# Patient Record
Sex: MALE | Race: WHITE
[De-identification: names, ages, dates, MRNs, and addresses within clinical notes are randomized per-mention and may not be internally consistent; named-entity substitution may affect disease eponyms.]

---

## 2018-05-08 ENCOUNTER — HOSPITAL ENCOUNTER (EMERGENCY)
Dept: HOSPITAL 25 - UCEAST | Age: 63
Discharge: HOME | End: 2018-05-08
Payer: COMMERCIAL

## 2018-05-08 VITALS — SYSTOLIC BLOOD PRESSURE: 146 MMHG | DIASTOLIC BLOOD PRESSURE: 81 MMHG

## 2018-05-08 DIAGNOSIS — M17.11: ICD-10-CM

## 2018-05-08 DIAGNOSIS — Z88.8: ICD-10-CM

## 2018-05-08 DIAGNOSIS — Z91.041: ICD-10-CM

## 2018-05-08 DIAGNOSIS — M25.561: Primary | ICD-10-CM

## 2018-05-08 PROCEDURE — 99202 OFFICE O/P NEW SF 15 MIN: CPT

## 2018-05-08 PROCEDURE — G0463 HOSPITAL OUTPT CLINIC VISIT: HCPCS

## 2018-05-08 NOTE — UC
Knee Pain HPI





- HPI Summary


HPI Summary: 





Patient is a 63-year-old male presenting to the  with chief complaint of 

right knee pain after stepping off of his tractor this morning, feeling a "pop" 

sound and states he has been having lateral knee pain since that time.  He has 

remained ambulatory, but states he is limping due to pain.  He has not taken 

anything over-the-counter for relief.  He has not used ice or heat.  He has 

been wearing a brace that he has had home.  Left hip surgery 8 years ago so he 

states he favors the right side.  Denies any numbness or tingling.  There is a 

mild amount of swelling to the knee.





- History of Current Complaint


Hx Obtained From: Patient


Onset/Duration: Sudden Onset


Severity Initially: Mild


Severity Currently: Mild


Pain Intensity: 5


Pain Scale Used: 0-10 Numeric


Character: Aching


Aggravating Factor(s): Weight Bearing


Alleviating Factor(s): Rest


Associated Signs And Symptoms: Positive: Swelling


Able to Bear Weight: Yes





- Risk Factors


Septic Arthritis Risk Factor: Negative


Gout Risk Factor: Age ^ 39, Male





<Aissatou Mueller - Last Filed: 05/08/18 19:08>





<Dina Plunkett - Last Filed: 05/08/18 19:29>





- History of Current Complaint


Chief Complaint: UCLowerExtremity


Stated Complaint: RIGHT KNEE INJURY


Time Seen by Provider: 05/08/18 18:04





- Allergies/Home Medications


Allergies/Adverse Reactions: 


 Allergies











Allergy/AdvReac Type Severity Reaction Status Date / Time


 


diphenhydramine Allergy  Hallucinati Verified 05/08/18 18:02





[From Benadryl]   ons  


 


Iodinated Contrast- Oral and Allergy  Unknown Verified 05/08/18 18:02





IV Dye   Reaction  





   Details  











Home Medications: 


 Home Medications





Atorvastatin* [Lipitor 10 MG*] 10 mg PO DAILY 05/08/18 [History Confirmed 05/08/ 18]











PMH/Surg Hx/FS Hx/Imm Hx


Previously Healthy: Yes





- Surgical History


Surgical History: Yes


Surgery Procedure, Year, and Place: APPENDECTOMY, 4 LITHOTRIPSYS, LT HIP 

REPLACEMENT





- Family History


Known Family History: Positive: Cardiac Disease, Other - cancer





- Social History


Occupation: Employed Full-time


Lives: With Family


Alcohol Use: None


Substance Use Type: None


Smoking Status (MU): Never Smoked Tobacco





<Aissatou Mueller - Last Filed: 05/08/18 19:08>





Review of Systems


Constitutional: Negative


Skin: Negative


ENT: Negative


Respiratory: Negative


Cardiovascular: Negative


Motor: Negative


Neurovascular: Negative


Musculoskeletal: Arthralgia


Neurological: Negative


Is Patient Immunocompromised?: No


All Other Systems Reviewed And Are Negative: Yes





<Aissatou Mueller UNIQUE - Last Filed: 05/08/18 19:08>





Physical Exam


Triage Information Reviewed: Yes


Appearance: Well-Appearing, Well-Nourished


Vital Signs: 


 Initial Vital Signs











Temp  97.5 F   05/08/18 17:55


 


Pulse  69   05/08/18 17:55


 


Resp  20   05/08/18 17:55


 


BP  146/81   05/08/18 17:55


 


Pulse Ox  97   05/08/18 17:55











Vital Signs Reviewed: Yes


Eye Exam: Normal


Eyes: Positive: Conjunctiva Clear


Neck exam: Normal


Neck: Positive: Supple, Nontender, No Lymphadenopathy


Respiratory Exam: Normal


Respiratory: Positive: Chest non-tender, Lungs clear


Cardiovascular Exam: Normal


Cardiovascular: Positive: RRR


Musculoskeletal: Positive: ROM Limited @ - R knee - d/t pain


Psychological Exam: Normal


Psychological: Positive: Normal Response To Family





<Aissatou Mueller UNIQUE - Last Filed: 05/08/18 19:08>


Vital Signs: 


 Initial Vital Signs











Temp  97.5 F   05/08/18 17:55


 


Pulse  69   05/08/18 17:55


 


Resp  20   05/08/18 17:55


 


BP  146/81   05/08/18 17:55


 


Pulse Ox  97   05/08/18 17:55














<Dina Plunkett - Last Filed: 05/08/18 19:29>





Knee Pain Course/Dx





- Course


Course Of Treatment: During the course of treatment, the patient is evaluated 

for right knee pain and swelling after stepping off his tractor this morning.  

He states he has been ambulating, but with pain.  Thorough physical exam was 

performed, focusing on knee special tests.  Pain on palpation over lateral 

aspect and superior aspect of knee with mild amount of effusion.  Due to 

patient pain around injury, physical exam was limited.  Valgus and varus force 

without pain.  No posterior sag sign, -posterior drawer test, - anterior drawer 

test.  Quadriceps active test negative.   Mcmurrys test not performed.  No 

laxity in the joint noted.   No temperature change or pallor noted bilaterally.

  No ecchymosis noted over knee. No lesion or disruption of skin is seen. Able 

to bear weight but with pain.  Knee immobilizer given.  Crutches given. Pulses 

intact bilaterally.  patient will follow up with ortho.





- Differential Dx/Diagnosis


Provider Diagnoses: Knee pain





<Aissatou Mueller - Last Filed: 05/08/18 19:08>





Discharge





- Sign-Out/Discharge


Documenting (check all that apply): Discharge/Admit/Transfer





- Billing Disposition and Condition


Condition: STABLE


Disposition: HOME





<Aissatou Mueller - Last Filed: 05/08/18 19:08>





- Billing Disposition and Condition


Condition: STABLE


Disposition: HOME





<Dina Plunkett - Last Filed: 05/08/18 19:29>





- Discharge Plan


Condition: Stable


Disposition: HOME


Patient Education Materials:  Knee Pain (ED)


Referrals: 


Roby Naranjo MD [Primary Care Provider] - 


Rolan Davies MD [Medical Doctor] - 


Additional Instructions: 


Please follow up with Dr. Davies


Call tomorrow to make appt


Keep the knee immobilizer applied while ambulating


Use crutches to ambulate





Attestation Statement


User Type: Provider - I was available for consult. This patient was seen by the 

JENNA. The patient was not presented to, seen by, or examined by me. -Brady





<Dina Plunkett - Last Filed: 05/08/18 19:29>

## 2018-05-08 NOTE — RAD
Indication: Unstable RIGHT knee with flexion following injury today.



Comparison: None.



Technique: RIGHT knee: AP, tunnel, lateral, sunrise views. 



Report: Negative for joint effusion, fracture, or malalignment. Mild osteophytosis and

mild medial joint space narrowing. Unremarkable soft tissue contours.



IMPRESSION: 

1. No traumatic injury of the RIGHT knee evident.

2. Kellgren and Frandy grade 2 osteoarthritis.

## 2018-11-26 ENCOUNTER — HOSPITAL ENCOUNTER (EMERGENCY)
Dept: HOSPITAL 25 - UCEAST | Age: 63
Discharge: HOME | End: 2018-11-26
Payer: COMMERCIAL

## 2018-11-26 VITALS — DIASTOLIC BLOOD PRESSURE: 97 MMHG | SYSTOLIC BLOOD PRESSURE: 143 MMHG
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
## 2018-11-26 DIAGNOSIS — S43.401A: Primary | ICD-10-CM
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
## 2018-11-26 DIAGNOSIS — S40.011A: ICD-10-CM

## 2018-11-26 DIAGNOSIS — Z88.8: ICD-10-CM

## 2018-11-26 DIAGNOSIS — Z91.041: ICD-10-CM

## 2018-11-26 PROCEDURE — 99212 OFFICE O/P EST SF 10 MIN: CPT

## 2018-11-26 PROCEDURE — G0463 HOSPITAL OUTPT CLINIC VISIT: HCPCS

## 2018-11-26 NOTE — UC
Shoulder Pain HPI





- HPI Summary


HPI Summary: 





63-year-old male comes to clinic today after falling and hurting his right 

scapula and shoulder.  He reports slipping and falling on the ice and he landed 

primarily on the right scapula.  The pain is the worst in the right scapula but 

this also pain in the right shoulder.  He denies any shortness of breath or 

difficulty breathing or any neck pain or head injury.  No numbness or weakness.

  Pain is worse with attempted movement of the right arm.  He does have 

decreased range of motion of the right arm secondary to pain.





- History of Current Complaint


Chief Complaint: UCUpperExtremity


Stated Complaint: SHOULDER INJURY


Time Seen by Provider: 11/26/18 18:07


Pain Intensity: 5





- Allergies/Home Medications


Allergies/Adverse Reactions: 


 Allergies











Allergy/AdvReac Type Severity Reaction Status Date / Time


 


diphenhydramine Allergy  Hallucinati Verified 11/26/18 18:00





[From Benadryl]   ons  


 


gabapentin Allergy  Altered Verified 11/26/18 18:01





   Mental  





   Status  


 


Iodinated Contrast- Oral and Allergy  Unknown Verified 11/26/18 18:00





IV Dye   Reaction  





   Details  











Home Medications: 


 Home Medications





Cholecalciferol TAB* [Vitamin D TAB*] 1 tab PO DAILY 11/26/18 [History 

Confirmed 11/26/18]


Vitamin B Complex CAP* [B Complex CAP*] 1 tab PO DAILY 11/26/18 [History 

Confirmed 11/26/18]











PMH/Surg Hx/FS Hx/Imm Hx


Previously Healthy: Yes


Endocrine History: Dyslipidemia





- Surgical History


Surgical History: Yes


Surgery Procedure, Year, and Place: APPENDECTOMY, 4 LITHOTRIPSYS, LT HIP 

REPLACEMENT





- Family History


Known Family History: Positive: Cardiac Disease, Other - cancer





- Social History


Alcohol Use: None


Substance Use Type: None


Smoking Status (MU): Never Smoked Tobacco





Review of Systems


All Other Systems Reviewed And Are Negative: Yes


Constitutional: Positive: Negative


Skin: Positive: Negative


Eyes: Positive: Negative


ENT: Positive: Negative


Respiratory: Positive: Negative


Cardiovascular: Positive: Negative


Gastrointestinal: Positive: Negative


Motor: Positive: Decreased ROM


Neurovascular: Positive: Negative


Musculoskeletal: Positive: Other: - SEE HPI


Neurological: Positive: Negative


Psychological: Positive: Negative


Is Patient Immunocompromised?: No





Physical Exam


Triage Information Reviewed: Yes


Appearance: Well-Appearing, No Pain Distress, Well-Nourished


Vital Signs: 


 Initial Vital Signs











Temp  98.5 F   11/26/18 17:57


 


Pulse  68   11/26/18 17:57


 


Resp  18   11/26/18 17:57


 


BP  143/97   11/26/18 17:57


 


Pulse Ox  99   11/26/18 17:57











Vital Signs Reviewed: Yes


Eye Exam: Normal


Eyes: Positive: Conjunctiva Clear


Neck exam: Normal


Neck: Positive: Supple, Nontender


Respiratory: Positive: Lungs clear, Normal breath sounds, No respiratory 

distress, Other: - Right scapula is tender to palpation there is some swelling 

of the right scapula.  Shoulder joint also is mildly tender.


Cardiovascular: Positive: RRR


Musculoskeletal: Positive: Other: - Fingers wrists and elbows have full range 

of motion and full strength.  Normal radial pulses bilaterally.  Left shoulder 

has full range of motion.  Right shoulder gives some pain if he tries to 

forearm extension abduction or internal rotation.  There is swelling and 

tenderness over the distribution of the right scapula.


Neurological Exam: Normal


Neurological: Positive: Alert, Muscle Tone Normal


Psychological Exam: Normal


Psychological: Positive: Age Appropriate Behavior


Skin Exam: Normal





Shoulder Course/Dx





- Course


Course Of Treatment: I discussed the x-rays with the patient.  I do not see an 

acute fracture on the right shoulder and scapula x-ray.  Radiologist reading is 

pending.  He will use a sling when necessary.  I demonstrated shoulder range of 

motion exercises to help avoid frozen shoulder.  Past follow-up primary care 

doctor.  If things get worse she needs to get a reevaluation.  On examination 

today there is no evidence of rib fracture or pneumothorax seen on the shoulder 

x-ray.  If he does not improve or has decreased range of motion with shoulder 

over the long-term he'll need follow-up with orthopedics.  Taking ibuprofen as 

needed for the pain.





- Differential Dx/Diagnosis


Provider Diagnosis: 


 Contusion of scapula, right, Right shoulder strain








Discharge





- Sign-Out/Discharge


Documenting (check all that apply): Patient Departure


All imaging exams completed and their final reports reviewed: No





- Discharge Plan


Condition: Stable


Disposition: HOME


Patient Education Materials:  Contusion in Adults (ED), Shoulder Sprain (ED)


Referrals: 


Roby Naranjo MD [Primary Care Provider] - 


Additional Instructions: 


FOLLOW UP WITH YOUR DOCTOR.


DO THE RANGE OF MOTION EXERCISES AS DISCUSSED TO HELP AVOID FROZEN SHOULDER.


GET RECHECKED FOR ANY WORSENING OF YOUR CONDITION; PAIN, SHORTNESS OF BREATH, 

YOU FEEL LIKE PASSING OUT, YOU FEEL ILL OR QUESTIONS OR CONCERNS.








- Billing Disposition and Condition


Condition: STABLE


Disposition: Home

## 2018-11-27 NOTE — UC
- Progress Note


Progress Note: 





X-RAY REPORT REVIEWED. OSTEOARTHRITIS BUT NO FRACTURE SEEN.  NO CHANGE IN 

MANAGEMENT.





Course/Dx





- Diagnoses


Provider Diagnoses: 


 Contusion of scapula, right, Right shoulder strain








Discharge





- Sign-Out/Discharge


Documenting (check all that apply): Post-Discharge Follow Up


All imaging exams completed and their final reports reviewed: Yes





- Discharge Plan


Condition: Stable


Disposition: HOME


Patient Education Materials:  Contusion in Adults (ED), Shoulder Sprain (ED)


Referrals: 


Roby Naranjo MD [Primary Care Provider] - 


Additional Instructions: 


FOLLOW UP WITH YOUR DOCTOR.


DO THE RANGE OF MOTION EXERCISES AS DISCUSSED TO HELP AVOID FROZEN SHOULDER.


GET RECHECKED FOR ANY WORSENING OF YOUR CONDITION; PAIN, SHORTNESS OF BREATH, 

YOU FEEL LIKE PASSING OUT, YOU FEEL ILL OR QUESTIONS OR CONCERNS.








- Billing Disposition and Condition


Condition: STABLE


Disposition: Home